# Patient Record
Sex: MALE | Race: WHITE | NOT HISPANIC OR LATINO | Employment: UNEMPLOYED | URBAN - METROPOLITAN AREA
[De-identification: names, ages, dates, MRNs, and addresses within clinical notes are randomized per-mention and may not be internally consistent; named-entity substitution may affect disease eponyms.]

---

## 2017-01-09 ENCOUNTER — HOSPITAL ENCOUNTER (EMERGENCY)
Facility: HOSPITAL | Age: 32
Discharge: HOME | End: 2017-01-09
Attending: EMERGENCY MEDICINE
Payer: MEDICAID

## 2017-01-09 VITALS
DIASTOLIC BLOOD PRESSURE: 69 MMHG | BODY MASS INDEX: 21.51 KG/M2 | TEMPERATURE: 99 F | OXYGEN SATURATION: 95 % | RESPIRATION RATE: 14 BRPM | SYSTOLIC BLOOD PRESSURE: 117 MMHG | HEART RATE: 111 BPM | WEIGHT: 126 LBS | HEIGHT: 64 IN

## 2017-01-09 DIAGNOSIS — N39.0 URINARY TRACT INFECTION WITHOUT HEMATURIA, SITE UNSPECIFIED: Primary | ICD-10-CM

## 2017-01-09 LAB
ALBUMIN SERPL BCP-MCNC: 4.1 G/DL
ALP SERPL-CCNC: 79 U/L
ALT SERPL W/O P-5'-P-CCNC: 12 U/L
ANION GAP SERPL CALC-SCNC: 13 MMOL/L
AST SERPL-CCNC: 16 U/L
BACTERIA #/AREA URNS AUTO: ABNORMAL /HPF
BASOPHILS # BLD AUTO: 0.01 K/UL
BASOPHILS NFR BLD: 0.1 %
BILIRUB SERPL-MCNC: 0.9 MG/DL
BILIRUB UR QL STRIP: NEGATIVE
BUN SERPL-MCNC: 12 MG/DL
CALCIUM SERPL-MCNC: 9.8 MG/DL
CHLORIDE SERPL-SCNC: 99 MMOL/L
CLARITY UR REFRACT.AUTO: CLEAR
CO2 SERPL-SCNC: 24 MMOL/L
COLOR UR AUTO: YELLOW
CREAT SERPL-MCNC: 1.1 MG/DL
DIFFERENTIAL METHOD: ABNORMAL
EOSINOPHIL # BLD AUTO: 0 K/UL
EOSINOPHIL NFR BLD: 0 %
ERYTHROCYTE [DISTWIDTH] IN BLOOD BY AUTOMATED COUNT: 12.8 %
EST. GFR  (AFRICAN AMERICAN): >60 ML/MIN/1.73 M^2
EST. GFR  (NON AFRICAN AMERICAN): >60 ML/MIN/1.73 M^2
FLUAV AG SPEC QL IA: NEGATIVE
FLUBV AG SPEC QL IA: NEGATIVE
GLUCOSE SERPL-MCNC: 90 MG/DL
GLUCOSE UR QL STRIP: NEGATIVE
HCT VFR BLD AUTO: 44.3 %
HETEROPH AB SERPL QL IA: NEGATIVE
HGB BLD-MCNC: 15 G/DL
HGB UR QL STRIP: NEGATIVE
KETONES UR QL STRIP: ABNORMAL
LACTATE SERPL-SCNC: 1 MMOL/L
LEUKOCYTE ESTERASE UR QL STRIP: ABNORMAL
LIPASE SERPL-CCNC: 5 U/L
LYMPHOCYTES # BLD AUTO: 1.6 K/UL
LYMPHOCYTES NFR BLD: 15.9 %
MAGNESIUM SERPL-MCNC: 2.3 MG/DL
MCH RBC QN AUTO: 28.2 PG
MCHC RBC AUTO-ENTMCNC: 33.9 %
MCV RBC AUTO: 83 FL
MICROSCOPIC COMMENT: ABNORMAL
MONOCYTES # BLD AUTO: 0.6 K/UL
MONOCYTES NFR BLD: 6.2 %
NEUTROPHILS # BLD AUTO: 7.8 K/UL
NEUTROPHILS NFR BLD: 77.4 %
NITRITE UR QL STRIP: POSITIVE
PH UR STRIP: 6 [PH] (ref 5–8)
PHOSPHATE SERPL-MCNC: 4.2 MG/DL
PLATELET # BLD AUTO: 272 K/UL
PMV BLD AUTO: 10.3 FL
POTASSIUM SERPL-SCNC: 4.8 MMOL/L
PROT SERPL-MCNC: 8.3 G/DL
PROT UR QL STRIP: NEGATIVE
RBC # BLD AUTO: 5.32 M/UL
RBC #/AREA URNS AUTO: 1 /HPF (ref 0–4)
SODIUM SERPL-SCNC: 136 MMOL/L
SP GR UR STRIP: 1.01 (ref 1–1.03)
SPECIMEN SOURCE: NORMAL
T4 FREE SERPL-MCNC: 1.11 NG/DL
TSH SERPL DL<=0.005 MIU/L-ACNC: 0.35 UIU/ML
URN SPEC COLLECT METH UR: ABNORMAL
UROBILINOGEN UR STRIP-ACNC: NEGATIVE EU/DL
WBC # BLD AUTO: 10.03 K/UL
WBC #/AREA URNS AUTO: 15 /HPF (ref 0–5)

## 2017-01-09 PROCEDURE — 80053 COMPREHEN METABOLIC PANEL: CPT

## 2017-01-09 PROCEDURE — 96365 THER/PROPH/DIAG IV INF INIT: CPT

## 2017-01-09 PROCEDURE — 63600175 PHARM REV CODE 636 W HCPCS: Performed by: EMERGENCY MEDICINE

## 2017-01-09 PROCEDURE — 87400 INFLUENZA A/B EACH AG IA: CPT

## 2017-01-09 PROCEDURE — 96361 HYDRATE IV INFUSION ADD-ON: CPT

## 2017-01-09 PROCEDURE — 84100 ASSAY OF PHOSPHORUS: CPT

## 2017-01-09 PROCEDURE — 83690 ASSAY OF LIPASE: CPT

## 2017-01-09 PROCEDURE — 87040 BLOOD CULTURE FOR BACTERIA: CPT

## 2017-01-09 PROCEDURE — 99284 EMERGENCY DEPT VISIT MOD MDM: CPT | Mod: 25

## 2017-01-09 PROCEDURE — 84443 ASSAY THYROID STIM HORMONE: CPT

## 2017-01-09 PROCEDURE — 86308 HETEROPHILE ANTIBODY SCREEN: CPT

## 2017-01-09 PROCEDURE — 25000003 PHARM REV CODE 250: Performed by: EMERGENCY MEDICINE

## 2017-01-09 PROCEDURE — 84439 ASSAY OF FREE THYROXINE: CPT

## 2017-01-09 PROCEDURE — 83605 ASSAY OF LACTIC ACID: CPT

## 2017-01-09 PROCEDURE — 99285 EMERGENCY DEPT VISIT HI MDM: CPT | Mod: ,,, | Performed by: EMERGENCY MEDICINE

## 2017-01-09 PROCEDURE — 81001 URINALYSIS AUTO W/SCOPE: CPT

## 2017-01-09 PROCEDURE — 85025 COMPLETE CBC W/AUTO DIFF WBC: CPT

## 2017-01-09 PROCEDURE — 83735 ASSAY OF MAGNESIUM: CPT

## 2017-01-09 RX ORDER — CIPROFLOXACIN 2 MG/ML
400 INJECTION, SOLUTION INTRAVENOUS
Status: COMPLETED | OUTPATIENT
Start: 2017-01-09 | End: 2017-01-09

## 2017-01-09 RX ORDER — CIPROFLOXACIN 500 MG/1
500 TABLET ORAL 2 TIMES DAILY
Qty: 20 TABLET | Refills: 0 | Status: SHIPPED | OUTPATIENT
Start: 2017-01-09 | End: 2017-01-19

## 2017-01-09 RX ORDER — ACETAMINOPHEN 500 MG
1000 TABLET ORAL
Status: COMPLETED | OUTPATIENT
Start: 2017-01-09 | End: 2017-01-09

## 2017-01-09 RX ADMIN — CIPROFLOXACIN 400 MG: 2 INJECTION, SOLUTION INTRAVENOUS at 09:01

## 2017-01-09 RX ADMIN — SODIUM CHLORIDE 1000 ML: 0.9 INJECTION, SOLUTION INTRAVENOUS at 05:01

## 2017-01-09 RX ADMIN — ACETAMINOPHEN 1000 MG: 500 TABLET ORAL at 09:01

## 2017-01-09 NOTE — ED AVS SNAPSHOT
[facility]  [address]  [facility] [id number]               Jakub Beckford   2017  4:04 PM   ED    Description:  Male : 1985   Department:  [facility]           Your Care was Coordinated By:     Provider Role From To    Librado Whatley MD Attending Provider [id number] --      Reason for Visit     Fever           Diagnoses this Visit        Comments    Urinary tract infection without hematuria, site unspecified    -  Primary       ED Disposition     ED Disposition Condition Comment    Discharge             To Do List            These Medications        Disp Refills Start End    ciprofloxacin HCl (CIPRO) 500 MG tablet 20 tablet 0 2017    Take 1 tablet (500 mg total) by mouth 2 (two) times daily. - Oral      [facility]     [facility] -  Assistance  Registered nurses in the [facility] provide clinical advisement, health education, appointment booking, and other advisory services.  Call for this free service at [phone].             Medications           Message regarding Medications     Verify the changes and/or additions to your medication regime listed below are the same as discussed with your clinician today.  If any of these changes or additions are incorrect, please notify your healthcare provider.        START taking these NEW medications        Refills    ciprofloxacin HCl (CIPRO) 500 MG tablet 0    Sig: Take 1 tablet (500 mg total) by mouth 2 (two) times daily.    Class: Print    Route: Oral      These medications were administered today        Dose Freq    sodium chloride 0.9% bolus 1,000 mL 1,000 mL ED 1 Time    Sig: Inject 1,000 mLs into the vein ED 1 Time.    Class: Normal    Route: Intravenous           Verify that the below list of medications is an accurate representation of the medications you are currently taking.  If none reported, the list may be  "blank. If incorrect, please contact your healthcare provider. Carry this list with you in case of emergency.           Current Medications     ciprofloxacin HCl (CIPRO) 500 MG tablet Take 1 tablet (500 mg total) by mouth 2 (two) times daily.           Clinical Reference Information           Your Vitals Were     BP Pulse Temp Resp Height Weight    128/60 112 99.6 °F (37.6 °C) (Oral) 18 5' 4" (1.626 m) 57.2 kg (126 lb)    SpO2 BMI             97% 21.63 kg/m2         Allergies as of 1/9/2017     No Known Allergies      Immunizations Administered on Date of Encounter - 1/9/2017     None      ED Micro, Lab, POCT     Start Ordered       Status Ordering Provider    01/09/17 1659 01/09/17 1658  Urinalysis Clean Catch  STAT      Final result     01/09/17 1658 01/09/17 1658  Urinalysis Microscopic  Once      Final result     01/09/17 1632 01/09/17 1631  Heterophile Ab Screen  Once      Final result     01/09/17 1631 01/09/17 1631  TSH  STAT      Final result     01/09/17 1631 01/09/17 1631  T4, free  Once      Final result     01/09/17 1630 01/09/17 1631  Blood culture x two cultures. Draw prior to antibiotics.  Every 15 min     Comments:  Aerobic and anaerobic   Start Status   01/09/17 1630 In process   01/09/17 1645 In process       Acknowledged     01/09/17 1630 01/09/17 1631  CBC auto differential  STAT      Final result     01/09/17 1630 01/09/17 1631  Comprehensive metabolic panel  STAT      Final result     01/09/17 1630 01/09/17 1631  Influenza antigen Nasopharyngeal Swab  Once      Final result     01/09/17 1630 01/09/17 1631  Lactic acid, plasma  STAT      Final result     01/09/17 1630 01/09/17 1631  Lipase  Once      Final result     01/09/17 1630 01/09/17 1631  Magnesium  STAT      Final result     01/09/17 1630 01/09/17 1631  Phosphorus  STAT      Final result       ED Imaging Orders     Start Ordered       Status Ordering Provider    01/09/17 1631 01/09/17 1631  X-Ray Chest PA And Lateral  1 time imaging      " Final result     01/09/17 1631 01/09/17 1631  CT Head Without Contrast  1 time imaging      Final result         Discharge Instructions       Monitor your temperature  Continue to use the acetaminophen  Follow up with [facility]  Push clear fluids   Take the antibiotics as prescribed    [facility] Sign-Up     Activating your [facility] account is as easy as 1-2-3!     1) Visit [email or web address], select Sign Up Now, enter this activation code and your date of birth, then select Next.  [email or web address]  Expires: 2/23/2017  8:03 PM      2) Create a username and password to use when you visit [facility] in the future and select a security question in case you lose your password and select Next.    3) Enter your e-mail address and click Sign Up!    Additional Information  If you have questions, please e-mail [email or web address] or call [phone] to talk to our [facility] staff. Remember, [facility] is NOT to be used for urgent needs. For medical emergencies, dial 911.          [facility] complies with applicable Federal civil rights laws and does not discriminate on the basis of race, color, national origin, age, disability, or sex.        Language Assistance Services     ATTENTION: Language assistance services are available, free of charge. Please call [phone].      ATENCIÓN: Si habla español, tiene a bucio disposición servicios gratuitos de asistencia lingüística. Llame al [phone].     CHÚ Ý: N?u b?n nói Ti?ng Vi?t, có các d?ch v? h? tr? ngôn ng? mi?n phí dành cho b?n. G?i s? [phone].

## 2017-01-09 NOTE — ED PROVIDER NOTES
"Encounter Date: 1/9/2017    SCRIBE #1 NOTE: I, Kalebabhijit Leary, am scribing for, and in the presence of, Dr. Whatley.       History     Chief Complaint   Patient presents with    Fever     for 2 weeks, headaches, sweating , fever     Review of patient's allergies indicates:  No Known Allergies  HPI Comments: Time seen by provider: 4:16 PM    This is a 31 y.o. male with no pertinent medical history who presents with complaint of intermittent episodes of malaise and myalgias, temporal headache, nightly diaphoresis, and subjective fevers for the past 2 weeks. He states starting to feel clammy and feverish on 12/25/2016 then developing a headache that was persistent for 3 days which improved with rest. He feeling better 3 days afterwards, but then had resumption and worsening of symptoms on 1/01/2017 after celebrating the New Year with EtOH. He describes worsened headache and subjective fever, with malaise, decreased appetite, and nightly diaphoresis that persisted for another 3 days and improved with Tylenol and increased fluid intake. Symptoms again resumed yesterday, but are not as severe as previously. Pt states waking up this morning with myalgias and dysgeusia. 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 He denies history of DM. He denies any IV drug use.     The history is provided by the patient.     History reviewed. No pertinent past medical history.  No past medical history pertinent negatives.  History reviewed. No pertinent past surgical history.  No family history on file.  Social History "   Substance Use Topics    Smoking status: Never Smoker    Smokeless tobacco: None    Alcohol use No     Review of Systems   Constitutional: Positive for diaphoresis and fever.   HENT: Negative for congestion, ear discharge, mouth sores, sinus pressure and sore throat.    Eyes: Negative for photophobia, discharge and visual disturbance.   Respiratory: Negative for cough and shortness of breath.    Cardiovascular: Negative for chest pain.   Gastrointestinal: Negative for abdominal pain, blood in stool, diarrhea, nausea and vomiting.   Genitourinary: Negative for difficulty urinating, dysuria, flank pain, frequency and hematuria.   Musculoskeletal: Positive for myalgias. Negative for arthralgias and back pain.   Skin: Negative for rash.   Neurological: Positive for headaches.       Physical Exam   Initial Vitals   BP Pulse Resp Temp SpO2   01/09/17 1548 01/09/17 1548 01/09/17 1548 01/09/17 1548 01/09/17 1548   128/60 112 18 99.6 °F (37.6 °C) 97 %     Physical Exam    Constitutional: He is cooperative. He does not have a sickly appearance. He does not appear ill.   HENT:   Head: Normocephalic and atraumatic.   Mouth/Throat: Oropharynx is clear and moist.   Eyes: Conjunctivae and lids are normal.   Neck: Neck supple. No JVD present.   Cardiovascular: Regular rhythm and normal heart sounds. Tachycardia present.    Pulmonary/Chest: Breath sounds normal. No accessory muscle usage. No tachypnea. No respiratory distress.   Abdominal: Normal appearance. There is no tenderness.   Genitourinary:   Genitourinary Comments: No flank pain to palpation   Musculoskeletal: Normal range of motion.   Neurological: He is alert and oriented to person, place, and time. He has normal strength. No cranial nerve deficit.   Skin: Skin is warm and dry.         ED Course   Procedures  Labs Reviewed   CBC W/ AUTO DIFFERENTIAL - Abnormal; Notable for the following:        Result Value    Gran # 7.8 (*)     Gran% 77.4 (*)     Lymph% 15.9 (*)      All other components within normal limits   TSH - Abnormal; Notable for the following:     TSH 0.350 (*)     All other components within normal limits   URINALYSIS - Abnormal; Notable for the following:     Ketones, UA 2+ (*)     Nitrite, UA Positive (*)     Leukocytes, UA 2+ (*)     All other components within normal limits    Narrative:     1 cup of urine   URINALYSIS MICROSCOPIC - Abnormal; Notable for the following:     WBC, UA 15 (*)     Bacteria, UA Few (*)     All other components within normal limits    Narrative:     1 cup of urine   CULTURE, BLOOD    Narrative:     Aerobic and anaerobic   CULTURE, BLOOD    Narrative:     Aerobic and anaerobic   COMPREHENSIVE METABOLIC PANEL   INFLUENZA A AND B ANTIGEN   LACTIC ACID, PLASMA   LIPASE   MAGNESIUM   PHOSPHORUS   HETEROPHILE AB SCREEN   T4, FREE          X-Rays:   Independently Interpreted Readings:   Chest X-Ray: No consolidation. No abnormal findings noted.      Medical Decision Making:   History:   Old Medical Records: I decided to obtain old medical records.  Initial Assessment:   31 y.o. White male presenting to the ED because of intermittent episodes of malaise, myalgias, subjective fever with 3 days interspersed where he feels better. He has had 3 episodes of this. He has associated headache, but has never documented fever. No localized symptoms or findings. No travel out of the country recently. No sick contact. Has had recent testing for HIV which were negative. Noted that he has not taken a flu shot. He doesn't use IV drugs and has no cardiac symptoms.   Differential Diagnosis:   My initial differential diagnoses include but are not limited to viral syndrome, influenza.  Independently Interpreted Test(s):   I have ordered and independently interpreted X-rays - see prior notes.  Clinical Tests:   Lab Tests: Ordered and Reviewed  Radiological Study: Ordered and Reviewed  ED Management:  We will do labs, blood cultures, CXR. Pt is quite tachycardic. We  will give IV fluids.             Scribe Attestation:   Scribe #1: I performed the above scribed service and the documentation accurately describes the services I performed. I attest to the accuracy of the note.    Attending Attestation:           Physician Attestation for Scribe:  Physician Attestation Statement for Scribe #1: I, Dr. Whatley, reviewed documentation, as scribed by Kaleb Leary in my presence, and it is both accurate and complete.         Attending ED Notes:   9:10 PM. Head CT was read by Radiology as normal non-contrast CT of the head.     9:14 PM. Pt evaluated in the ED because of vague intermittent symptoms which are suggestive of infection, but without any localizing symptoms or complaints. Labs were sent which did not show significant findings. CXR was normal. CT likewise shows no changes. However, his UA was positive for nitrates and leukocytes. Pt had no complaints of dysuria or frequency. Pt had a temperature noted to be 100.9 orally. We will give antibiotics and send home on Cipro.           ED Course     Clinical Impression:   The encounter diagnosis was Urinary tract infection without hematuria, site unspecified.    Disposition:   Disposition: Discharged  Condition: Stable       Librado Whatley MD  01/20/17 [id number]

## 2017-01-09 NOTE — ED TRIAGE NOTES
Pt reports having fever off and on since margarita. Pt reports he went to the dr and they told him to come here. Pt reports temp was 99.9.

## 2017-01-10 NOTE — DISCHARGE INSTRUCTIONS
Monitor your temperature  Continue to use the acetaminophen  Follow up with [facility]  Push clear fluids   Take the antibiotics as prescribed

## 2017-01-14 LAB
BACTERIA BLD CULT: NORMAL
BACTERIA BLD CULT: NORMAL

## 2018-01-03 ENCOUNTER — EMERGENCY (EMERGENCY)
Facility: HOSPITAL | Age: 33
LOS: 1 days | Discharge: HOME | End: 2018-01-03
Admitting: EMERGENCY MEDICINE
Payer: MEDICAID

## 2018-01-03 VITALS
SYSTOLIC BLOOD PRESSURE: 117 MMHG | OXYGEN SATURATION: 99 % | HEART RATE: 84 BPM | TEMPERATURE: 98 F | RESPIRATION RATE: 18 BRPM | DIASTOLIC BLOOD PRESSURE: 76 MMHG

## 2018-01-03 DIAGNOSIS — Z79.2 LONG TERM (CURRENT) USE OF ANTIBIOTICS: ICD-10-CM

## 2018-01-03 DIAGNOSIS — Z79.1 LONG TERM (CURRENT) USE OF NON-STEROIDAL ANTI-INFLAMMATORIES (NSAID): ICD-10-CM

## 2018-01-03 DIAGNOSIS — J02.0 STREPTOCOCCAL PHARYNGITIS: ICD-10-CM

## 2018-01-03 DIAGNOSIS — J02.9 ACUTE PHARYNGITIS, UNSPECIFIED: ICD-10-CM

## 2018-01-03 PROCEDURE — 99283 EMERGENCY DEPT VISIT LOW MDM: CPT

## 2018-01-03 RX ORDER — IBUPROFEN 200 MG
1 TABLET ORAL
Qty: 28 | Refills: 0 | OUTPATIENT
Start: 2018-01-03 | End: 2018-01-09

## 2018-01-03 RX ORDER — PENICILLIN V POTASSIUM 250 MG
1 TABLET ORAL
Qty: 20 | Refills: 0 | OUTPATIENT
Start: 2018-01-03 | End: 2018-01-12

## 2018-01-03 NOTE — ED PROVIDER NOTE - OBJECTIVE STATEMENT
32y M with hx of strep throat presents to ED c/o fever. Pt states fever began 3 days ago (Tmax 101). Also reports cervical lymphadenopathy and sore throat. Pt states he has been taking ibuprofen for fever (last took 10:30 this morning), with some relief of symptoms. Denies cough, SOB, rash.

## 2018-01-03 NOTE — ED PROVIDER NOTE - ENMT, MLM
Bilateral erythematous tonsillar hypertrophy. Uvula midline. No exudates, no trismus. Tolerating secretions.

## 2018-08-29 ENCOUNTER — EMERGENCY (EMERGENCY)
Facility: HOSPITAL | Age: 33
LOS: 1 days | Discharge: HOME | End: 2018-08-29
Attending: EMERGENCY MEDICINE | Admitting: EMERGENCY MEDICINE
Payer: SELF-PAY

## 2018-08-29 VITALS
HEART RATE: 100 BPM | TEMPERATURE: 98 F | SYSTOLIC BLOOD PRESSURE: 120 MMHG | RESPIRATION RATE: 18 BRPM | DIASTOLIC BLOOD PRESSURE: 83 MMHG | OXYGEN SATURATION: 97 %

## 2018-08-29 DIAGNOSIS — R05 COUGH: ICD-10-CM

## 2018-08-29 DIAGNOSIS — Z79.2 LONG TERM (CURRENT) USE OF ANTIBIOTICS: ICD-10-CM

## 2018-08-29 DIAGNOSIS — Z79.1 LONG TERM (CURRENT) USE OF NON-STEROIDAL ANTI-INFLAMMATORIES (NSAID): ICD-10-CM

## 2018-08-29 DIAGNOSIS — J18.9 PNEUMONIA, UNSPECIFIED ORGANISM: ICD-10-CM

## 2018-08-29 PROBLEM — J02.0 STREPTOCOCCAL PHARYNGITIS: Chronic | Status: ACTIVE | Noted: 2018-01-03

## 2018-08-29 LAB — S PYO AG SPEC QL IA: NEGATIVE — SIGNIFICANT CHANGE UP

## 2018-08-29 PROCEDURE — 71046 X-RAY EXAM CHEST 2 VIEWS: CPT | Mod: 26

## 2018-08-29 PROCEDURE — 99283 EMERGENCY DEPT VISIT LOW MDM: CPT | Mod: 25

## 2018-08-29 RX ORDER — CLARITHROMYCIN 500 MG
1 TABLET ORAL
Qty: 14 | Refills: 0 | OUTPATIENT
Start: 2018-08-29 | End: 2018-09-04

## 2018-08-29 NOTE — ED ADULT NURSE NOTE - NSIMPLEMENTINTERV_GEN_ALL_ED
Implemented All Universal Safety Interventions:  [place] to call system. Call bell, personal items and telephone within reach. Instruct patient to call for assistance. Room bathroom lighting operational. Non-slip footwear when patient is off stretcher. Physically safe environment: no spills, clutter or unnecessary equipment. Stretcher in lowest position, wheels locked, appropriate side rails in place.

## 2018-08-29 NOTE — ED PROVIDER NOTE - PROGRESS NOTE DETAILS
Pt remains stable; no distress;  d/w pt -- no obvious infiltrate, possible atypical pna; given length of symptoms with fever, will treat;  pt reports concern that his throat has not been the same since his strep throat diagnosis in Jan 2018.  Given referral for ENT follow up

## 2018-08-29 NOTE — ED PROVIDER NOTE - PHYSICAL EXAMINATION
CONSTITUTIONAL: Well-appearing; well-nourished; in no apparent distress.   HEAD: Normocephalic; atraumatic.   EYES: PERRL; EOM intact; conjunctiva and sclera clear; no conjunctival pallor  ENT: normal nose; no rhinorrhea; mildly enlarged tonsils, no erythema or lesions.   airway patent  NECK: Supple; non-tender; no stiffness  CARDIOVASCULAR: Normal S1, S2; no murmurs, rubs, or gallops. Regular rate and rhythm.   RESPIRATORY: Breathing easily; breath sounds clear and equal bilaterally; no wheezes, rhonchi, or rales.  GI: Soft; non-distended; non-tender; no palpable organomegaly. no rebound no guarding  EXT: No cyanosis or edema; N/V intact  SKIN: Normal for age and race; warm; dry; good turgor; no apparent lesions or rash. no cyanosis  NEURO: Alert and oriented; face symmetric; grossly unremarkable.   Sensation grossly intact; moving all extremities  PSYCHOLOGICAL: The patient’s mood and manner are appropriate.

## 2018-08-29 NOTE — ED PROVIDER NOTE - OBJECTIVE STATEMENT
31 yo M here c/o cough and subjective fevers for about a week.  States cough started after biking in the rain about 10 days ago.  States he noted a temp of 101 Saturday.  Pt reports a sore throat as well.  Denies travel out of country;  Pt reports dark yellow, brown sputum production, no chest pain.  States he felt better yesterday day but by night time was much worse

## 2018-08-29 NOTE — ED ADULT NURSE NOTE - OBJECTIVE STATEMENT
here c/o cough and subjective fevers for about a week.  States cough started after biking in the rain about 10 days ago.  States he noted a temp of 101 Saturday.  Pt reports a sore throat as well.

## 2018-08-29 NOTE — ED PROVIDER NOTE - MEDICAL DECISION MAKING DETAILS
33 yo M, cough, fevers x 1 week; well appearing, no respiratory distress, currently afebrile ; will check for strep, pna;

## 2018-08-29 NOTE — ED ADULT TRIAGE NOTE - CHIEF COMPLAINT QUOTE
pt states cough  w/ yellow sputum and chills for x 7 days. pt states cough  w/ yellow sputum and chills for x 7 days. mask placed in triage.

## 2018-08-29 NOTE — ED PROVIDER NOTE - NS ED ROS FT
Other than symptoms associated with present events the following is reported:  General:  no weight loss.  HEENT:  No sore throat.  Respiratory:  no dyspnea, no wheeze.  Cardiovascular:  No chest pain, no palpitations, no orthopnea.  GI: No abdominal pain, no nausea/vomiting, no diarrhea.  : No dysuria, no frequency, no urgency.  Musculoskeletal:  No joint pain, no myalgia.  Endocrine:  No generalized weakness, no polyuria.  Neurological:  No headache, no focal weakness.   Psychiatric: No emotional stress, no depression.  Derm:  No rash.  Heme:  No bruising, no bleeding.

## 2018-11-19 NOTE — ED PROVIDER NOTE - NS ED MD DISPO DISCHARGE CCDA
Message   Recorded as Task   Date: 03/30/2018 10:42 AM, Created By: Leonela Ndiaye   Task Name: 4. Patient Message   Assigned To: Leonela Ndiaye   Regarding Patient: ADAMA BERUMEN, Status: In Progress   Comment:    Leonela Ndiaye - 30 Mar 2018 10:42 AM     TASK CREATED  Pt wants to know if she can have PT for her right leg pain / hip pain. She is taking the Diclofenac that was prescribed but wants to also try PT.  Pt would like to go to [facility] for PT if authorized.   ZAC ALEJANDRA - 30 Mar 2018 11:00 AM     TASK REPLIED TO: Previously Assigned To ZAC ALEJANDRA  PT order entered.   Leonela Ndiaye - 30 Mar 2018 11:43 AM     TASK EDITED  pt aware. faxed PT order to [facility]   Leonela Ndiaye - 30 Mar 2018 11:43 AM     TASK IN PROGRESS        Plan   1. Physical Therapy Referral/Consult Treatment and Evaluation  neuropathy radiating pain   in right leg.  Status: Active  Requested for: 30Mar2018  Care Summary provided. : Yes    Signatures   Electronically signed by : Leonela Ndiaye R.N.; Mar 30 2018 11:44AM CST    
Patient/Caregiver provided printed discharge information.

## 2022-08-03 ENCOUNTER — NURSE TRIAGE (OUTPATIENT)
Dept: ADMINISTRATIVE | Facility: CLINIC | Age: 37
End: 2022-08-03
Payer: MEDICAID
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
## 2022-11-17 NOTE — ED ADULT NURSE NOTE - NS ED NURSE LEVEL OF CONSCIOUSNESS ORIENTATION
Detail Level: Zone Length Of Therapy: 1 year Comments: Started 5/2021 Add High Risk Medication Management Associated Diagnosis?: No Oriented - self; Oriented - place; Oriented - time

## 2023-01-31 NOTE — ED ADULT NURSE NOTE - NSHISCREENINGQ1_ED_A_ED
Normal for race No Staged Advancement Flap Text: The defect edges were debeveled with a #15 scalpel blade.  Given the location of the defect, shape of the defect and the proximity to free margins a staged advancement flap was deemed most appropriate.  Using a sterile surgical marker, an appropriate advancement flap was drawn incorporating the defect and placing the expected incisions within the relaxed skin tension lines where possible. The area thus outlined was incised deep to adipose tissue with a #15 scalpel blade.  The skin margins were undermined to an appropriate distance in all directions utilizing iris scissors.